# Patient Record
Sex: FEMALE | Race: OTHER | HISPANIC OR LATINO | Employment: PART TIME | URBAN - METROPOLITAN AREA
[De-identification: names, ages, dates, MRNs, and addresses within clinical notes are randomized per-mention and may not be internally consistent; named-entity substitution may affect disease eponyms.]

---

## 2022-07-01 ENCOUNTER — HOSPITAL ENCOUNTER (EMERGENCY)
Facility: MEDICAL CENTER | Age: 22
End: 2022-07-01
Attending: EMERGENCY MEDICINE
Payer: MEDICAID

## 2022-07-01 ENCOUNTER — APPOINTMENT (OUTPATIENT)
Dept: RADIOLOGY | Facility: MEDICAL CENTER | Age: 22
End: 2022-07-01
Attending: EMERGENCY MEDICINE
Payer: MEDICAID

## 2022-07-01 VITALS
BODY MASS INDEX: 25.05 KG/M2 | RESPIRATION RATE: 16 BRPM | SYSTOLIC BLOOD PRESSURE: 106 MMHG | HEART RATE: 72 BPM | DIASTOLIC BLOOD PRESSURE: 72 MMHG | HEIGHT: 65 IN | WEIGHT: 150.35 LBS | TEMPERATURE: 98.2 F | OXYGEN SATURATION: 96 %

## 2022-07-01 DIAGNOSIS — O00.102 LEFT TUBAL PREGNANCY WITHOUT INTRAUTERINE PREGNANCY: ICD-10-CM

## 2022-07-01 DIAGNOSIS — Z33.2 TERMINATION OF PREGNANCY (FETUS): ICD-10-CM

## 2022-07-01 LAB
ALBUMIN SERPL BCP-MCNC: 4.4 G/DL (ref 3.2–4.9)
ALBUMIN/GLOB SERPL: 1.4 G/DL
ALP SERPL-CCNC: 48 U/L (ref 30–99)
ALT SERPL-CCNC: 35 U/L (ref 2–50)
ANION GAP SERPL CALC-SCNC: 12 MMOL/L (ref 7–16)
AST SERPL-CCNC: 22 U/L (ref 12–45)
B-HCG SERPL-ACNC: 3931 MIU/ML (ref 0–5)
BASOPHILS # BLD AUTO: 0.3 % (ref 0–1.8)
BASOPHILS # BLD: 0.03 K/UL (ref 0–0.12)
BILIRUB SERPL-MCNC: 0.2 MG/DL (ref 0.1–1.5)
BUN SERPL-MCNC: 8 MG/DL (ref 8–22)
CALCIUM SERPL-MCNC: 10 MG/DL (ref 8.5–10.5)
CHLORIDE SERPL-SCNC: 101 MMOL/L (ref 96–112)
CO2 SERPL-SCNC: 22 MMOL/L (ref 20–33)
CREAT SERPL-MCNC: 0.57 MG/DL (ref 0.5–1.4)
EOSINOPHIL # BLD AUTO: 0.15 K/UL (ref 0–0.51)
EOSINOPHIL NFR BLD: 1.7 % (ref 0–6.9)
ERYTHROCYTE [DISTWIDTH] IN BLOOD BY AUTOMATED COUNT: 44.3 FL (ref 35.9–50)
GFR SERPLBLD CREATININE-BSD FMLA CKD-EPI: 133 ML/MIN/1.73 M 2
GLOBULIN SER CALC-MCNC: 3.1 G/DL (ref 1.9–3.5)
GLUCOSE SERPL-MCNC: 87 MG/DL (ref 65–99)
HCT VFR BLD AUTO: 38 % (ref 37–47)
HGB BLD-MCNC: 12.9 G/DL (ref 12–16)
IMM GRANULOCYTES # BLD AUTO: 0.02 K/UL (ref 0–0.11)
IMM GRANULOCYTES NFR BLD AUTO: 0.2 % (ref 0–0.9)
LYMPHOCYTES # BLD AUTO: 1.73 K/UL (ref 1–4.8)
LYMPHOCYTES NFR BLD: 19.4 % (ref 22–41)
MCH RBC QN AUTO: 29.6 PG (ref 27–33)
MCHC RBC AUTO-ENTMCNC: 33.9 G/DL (ref 33.6–35)
MCV RBC AUTO: 87.2 FL (ref 81.4–97.8)
MONOCYTES # BLD AUTO: 0.71 K/UL (ref 0–0.85)
MONOCYTES NFR BLD AUTO: 8 % (ref 0–13.4)
NEUTROPHILS # BLD AUTO: 6.27 K/UL (ref 2–7.15)
NEUTROPHILS NFR BLD: 70.4 % (ref 44–72)
NRBC # BLD AUTO: 0 K/UL
NRBC BLD-RTO: 0 /100 WBC
PLATELET # BLD AUTO: 290 K/UL (ref 164–446)
PMV BLD AUTO: 9.4 FL (ref 9–12.9)
POTASSIUM SERPL-SCNC: 3.9 MMOL/L (ref 3.6–5.5)
PROT SERPL-MCNC: 7.5 G/DL (ref 6–8.2)
RBC # BLD AUTO: 4.36 M/UL (ref 4.2–5.4)
SODIUM SERPL-SCNC: 135 MMOL/L (ref 135–145)
WBC # BLD AUTO: 8.9 K/UL (ref 4.8–10.8)

## 2022-07-01 PROCEDURE — A9270 NON-COVERED ITEM OR SERVICE: HCPCS | Performed by: EMERGENCY MEDICINE

## 2022-07-01 PROCEDURE — 84702 CHORIONIC GONADOTROPIN TEST: CPT

## 2022-07-01 PROCEDURE — 700102 HCHG RX REV CODE 250 W/ 637 OVERRIDE(OP): Performed by: EMERGENCY MEDICINE

## 2022-07-01 PROCEDURE — 36415 COLL VENOUS BLD VENIPUNCTURE: CPT

## 2022-07-01 PROCEDURE — 85025 COMPLETE CBC W/AUTO DIFF WBC: CPT

## 2022-07-01 PROCEDURE — 76856 US EXAM PELVIC COMPLETE: CPT

## 2022-07-01 PROCEDURE — 80053 COMPREHEN METABOLIC PANEL: CPT

## 2022-07-01 PROCEDURE — 99284 EMERGENCY DEPT VISIT MOD MDM: CPT

## 2022-07-01 RX ADMIN — IBUPROFEN 800 MG: 200 TABLET, FILM COATED ORAL at 20:26

## 2022-07-02 NOTE — ED NOTES
Pt provided discharge paperwork and educated on their contents. Pt discharged from ED w/ steady gait, breathing normally, GCS 15.

## 2022-07-02 NOTE — ED TRIAGE NOTES
"Chief Complaint   Patient presents with   • Abdominal Pain     Pt had  on . Pt went to [facility] on Wednesday for check up and was told she possibly has endometriosis, PID, gonorrhea and chlamydia. Pt denies any abnormal symptoms prior to this appointment. Pt was tested and given prophylactic abx on Wednesday and has been having 8/10 lower abdominal pain since.      Pt ambulatory. Pt educated to inform staff of any changes.     /78   Pulse (!) 105   Temp 37.2 °C (98.9 °F) (Temporal)   Resp 18   Ht 1.651 m (5' 5\")   Wt 68.2 kg (150 lb 5.7 oz)   SpO2 98%   BMI 25.02 kg/m²     "

## 2022-07-02 NOTE — ED PROVIDER NOTES
ED Provider Note    CHIEF COMPLAINT  Chief Complaint   Patient presents with   • Abdominal Pain     Pt had  on . Pt went to [facility] on Wednesday for check up and was told she possibly has endometriosis, PID, gonorrhea and chlamydia. Pt denies any abnormal symptoms prior to this appointment. Pt was tested and given prophylactic abx on Wednesday and has been having 8/10 lower abdominal pain since.        HPI  Tobias Coffey is a 21 y.o. female who presents to the emergency department with chief complaint of lower abdominal cramping and vaginal bleeding.  The patient had a planned  through [facility] on Monday where she took oral medications she states she had some bleeding but still had some cramping so she went back Wednesday and they treated her again with methotrexate.  She states at that time they said she might have endometritis and also treated her with Rocephin and doxycycline but she states she is sexually active with one partner and has not had any abnormal vaginal discharge itching burning or fevers.  She presents here today because she continues to have cramping in her lower abdomen she states she is passing clots and having some bleeding but its not heavy she is not going through multiple pads an hour.  She is a G1, P0 her LMP was May 24    REVIEW OF SYSTEMS  Positives as above. Pertinent negatives include nausea vomiting fevers chills abnormal vaginal discharge itching burning dysuria hematuria flank pain  All other review of systems are negative    PAST MEDICAL HISTORY       SOCIAL HISTORY  Social History     Tobacco Use   • Smoking status: Never Smoker   • Smokeless tobacco: Never Used   Vaping Use   • Vaping Use: Never used   Substance and Sexual Activity   • Alcohol use: Not Currently   • Drug use: Not Currently   • Sexual activity: Not on file       SURGICAL HISTORY  patient denies any surgical history    CURRENT MEDICATIONS  Home Medications      "Reviewed by Cheryl Marie R.N. (Registered Nurse) on 22 at 1900  Med List Status: Not Addressed   Medication Last Dose Status        Patient Kirill Taking any Medications                       ALLERGIES  No Known Allergies    PHYSICAL EXAM  VITAL SIGNS: /69   Pulse 88   Temp 37.2 °C (98.9 °F) (Temporal)   Resp 18   Ht 1.651 m (5' 5\")   Wt 68.2 kg (150 lb 5.7 oz)   SpO2 98%   BMI 25.02 kg/m²    Pulse ox interpretation: I interpret this pulse ox as normal.  Constitutional: Alert in no apparent distress.  HENT: Normocephalic atraumatic, MMM  Eyes: PER, Conjunctiva normal, Non-icteric.   Neck: Normal range of motion, No tenderness, Supple, No stridor.   Cardiovascular: Regular rate and rhythm, no murmurs.   Thorax & Lungs: Normal breath sounds, No respiratory distress, No wheezing, No chest tenderness.   Abdomen: Bowel sounds normal, Soft, No tenderness, No pulsatile masses. No peritoneal signs.  Skin: Warm, Dry, No erythema, No rash.   Back: No bony tenderness, No CVA tenderness.   Extremities/MSK: Intact equal distal pulses, No edema, No tenderness, No cyanosis, no major deformities noted  Neurologic: Alert and oriented x3, No focal deficits noted.       DIFFERENTIAL DIAGNOSIS AND WORK UP PLAN    This is a 21 y.o. female who presents with planned  that she is taking methotrexate x2 she states she is having a lot of cramping and bleeding we discussed the normal findings of a planned and medically induced  in terms of the cramping and bleeding but we will perform an ultrasound at this time to evaluate for retained products which she is not having heavy bleeding at this time she otherwise well-appearing her abdomen is soft nontender she has not taken anything for pain since this morning it was Tylenol she has not taken ibuprofen in a few days.    DIAGNOSTIC STUDIES / PROCEDURES    EKG  No results found for this or any previous visit.    LABS  Pertinent Lab Findings    Labs Reviewed " "  HCG QUANTITATIVE - Abnormal; Notable for the following components:       Result Value    Bhcg 3931.0 (*)     All other components within normal limits   CBC WITH DIFFERENTIAL - Abnormal; Notable for the following components:    Lymphocytes 19.40 (*)     All other components within normal limits   COMP METABOLIC PANEL   ESTIMATED GFR       RADIOLOGY  US-PELVIC COMPLETE (TRANSABDOMINAL/TRANSVAGINAL) (COMBO)   Final Result         1.  Thickened heterogeneous endometrial stripe, may represent proliferative changes given patient age. Given recent history of , could represent retained products of conception.   2.  Nabothian cysts        The radiologist's interpretation of all radiological studies have been reviewed by me.      COURSE & MEDICAL DECISION MAKING  Pertinent Labs & Imaging studies reviewed. (See chart for details)    9:47 PM  I treated the patient with ibuprofen and she did have some improvement in her symptoms fortunately there is no evidence of elevated white blood cell count and she has a normal hemoglobin, her hCG is elevated at 3900.  I did discuss the case with Dr. Epps on-call for OB/GYN who at this time based on the fact that she is not having heavy bleeding recommends repeat hCG and follow-up in his clinic next week.  Strict return precautions for any severe bleeding fevers nausea vomiting.  I discussed this with the patient and her partner at the bedside they understand and feel comfortable with the plan.    /72   Pulse 72   Temp 36.8 °C (98.2 °F) (Temporal)   Resp 16   Ht 1.651 m (5' 5\")   Wt 68.2 kg (150 lb 5.7 oz)   SpO2 96%   BMI 25.02 kg/m²       I verified that the patient was wearing a mask and I was wearing appropriate PPE every time I entered the room. The patient's mask was on the patient at all times during my encounter except for a brief view of the oropharynx.      The patient will return for new or worsening symptoms and is stable at the time of discharge.    The " patient is referred to a primary physician for blood pressure management, diabetic screening, and for all other preventative health concerns.    DISPOSITION:  Patient will be discharged home in stable condition.    FOLLOW UP:  Giovanny Epps M.D.  [address]  [facility] [id number] [phone]    Follow up on 7/8/2022  at 12:30 for f/u of hCG levels an possible repeat methotrexate.    [facility], Emergency Dept  [address] [address] [phone]    If symptoms worsen      OUTPATIENT MEDICATIONS:  There are no discharge medications for this patient.          FINAL IMPRESSION        CANCELED: HCG QUANTITATIVE    Termination of pregnancy (fetus)             Electronically signed by: Dulce Elena M.D., 7/1/2022 7:59 PM    This dictation has been created using voice recognition software and/or scribes. The accuracy of the dictation is limited by the abilities of the software and the expertise of the scribes. I expect there may be some errors of grammar and possibly content. I made every attempt to manually correct the errors within my dictation. However, errors related to voice recognition software and/or scribes may still exist and should be interpreted within the appropriate context.

## 2022-07-02 NOTE — ED NOTES
Pt ambulated to assigned room with a steady and stable gait. Pt now laying quietly in gurney. Pt still c/o abdominal pain but no apparent distress noted at this time. Breathing is non-labored with stable VS. Pt given warm blanket for comfort. Family at bedside.

## 2022-07-08 ENCOUNTER — HOSPITAL ENCOUNTER (OUTPATIENT)
Dept: LAB | Facility: MEDICAL CENTER | Age: 22
End: 2022-07-08
Attending: OBSTETRICS & GYNECOLOGY
Payer: MEDICAID

## 2022-07-08 LAB — B-HCG SERPL-ACNC: 2189 MIU/ML (ref 0–5)

## 2022-07-08 PROCEDURE — 36415 COLL VENOUS BLD VENIPUNCTURE: CPT

## 2022-07-08 PROCEDURE — 84702 CHORIONIC GONADOTROPIN TEST: CPT

## 2025-05-15 ENCOUNTER — HOSPITAL ENCOUNTER (EMERGENCY)
Facility: MEDICAL CENTER | Age: 25
End: 2025-05-16
Attending: EMERGENCY MEDICINE

## 2025-05-15 ENCOUNTER — APPOINTMENT (OUTPATIENT)
Dept: RADIOLOGY | Facility: MEDICAL CENTER | Age: 25
End: 2025-05-15
Attending: EMERGENCY MEDICINE

## 2025-05-15 ENCOUNTER — ANESTHESIA (OUTPATIENT)
Dept: SURGERY | Facility: MEDICAL CENTER | Age: 25
End: 2025-05-15

## 2025-05-15 ENCOUNTER — ANESTHESIA EVENT (OUTPATIENT)
Dept: SURGERY | Facility: MEDICAL CENTER | Age: 25
End: 2025-05-15

## 2025-05-15 DIAGNOSIS — O00.202 LEFT OVARIAN PREGNANCY WITHOUT INTRAUTERINE PREGNANCY: ICD-10-CM

## 2025-05-15 DIAGNOSIS — R10.0 SURGICAL ABDOMEN: Primary | ICD-10-CM

## 2025-05-15 LAB
ABO GROUP BLD: NORMAL
ALBUMIN SERPL BCP-MCNC: 4.4 G/DL (ref 3.2–4.9)
ALBUMIN/GLOB SERPL: 1.3 G/DL
ALP SERPL-CCNC: 73 U/L (ref 30–99)
ALT SERPL-CCNC: 25 U/L (ref 2–50)
ANION GAP SERPL CALC-SCNC: 12 MMOL/L (ref 7–16)
APPEARANCE UR: CLEAR
AST SERPL-CCNC: 20 U/L (ref 12–45)
B-HCG SERPL-ACNC: 481 MIU/ML (ref 0–5)
BACTERIA #/AREA URNS HPF: ABNORMAL /HPF
BASOPHILS # BLD AUTO: 0.4 % (ref 0–1.8)
BASOPHILS # BLD: 0.04 K/UL (ref 0–0.12)
BILIRUB SERPL-MCNC: 0.3 MG/DL (ref 0.1–1.5)
BILIRUB UR QL STRIP.AUTO: NEGATIVE
BLD GP AB SCN SERPL QL: NORMAL
BUN SERPL-MCNC: 8 MG/DL (ref 8–22)
CALCIUM ALBUM COR SERPL-MCNC: 8.8 MG/DL (ref 8.5–10.5)
CALCIUM SERPL-MCNC: 9.1 MG/DL (ref 8.5–10.5)
CASTS URNS QL MICRO: ABNORMAL /LPF (ref 0–2)
CHLORIDE SERPL-SCNC: 103 MMOL/L (ref 96–112)
CO2 SERPL-SCNC: 20 MMOL/L (ref 20–33)
COLOR UR: YELLOW
CREAT SERPL-MCNC: 0.75 MG/DL (ref 0.5–1.4)
EOSINOPHIL # BLD AUTO: 0.01 K/UL (ref 0–0.51)
EOSINOPHIL NFR BLD: 0.1 % (ref 0–6.9)
EPITHELIAL CELLS 1715: ABNORMAL /HPF (ref 0–5)
ERYTHROCYTE [DISTWIDTH] IN BLOOD BY AUTOMATED COUNT: 43.2 FL (ref 35.9–50)
GFR SERPLBLD CREATININE-BSD FMLA CKD-EPI: 115 ML/MIN/1.73 M 2
GLOBULIN SER CALC-MCNC: 3.4 G/DL (ref 1.9–3.5)
GLUCOSE SERPL-MCNC: 103 MG/DL (ref 65–99)
GLUCOSE UR STRIP.AUTO-MCNC: NEGATIVE MG/DL
HCT VFR BLD AUTO: 42.9 % (ref 37–47)
HGB BLD-MCNC: 14.7 G/DL (ref 12–16)
HYALINE CAST   1831: PRESENT /LPF
IMM GRANULOCYTES # BLD AUTO: 0.05 K/UL (ref 0–0.11)
IMM GRANULOCYTES NFR BLD AUTO: 0.5 % (ref 0–0.9)
KETONES UR STRIP.AUTO-MCNC: 80 MG/DL
LEUKOCYTE ESTERASE UR QL STRIP.AUTO: NEGATIVE
LYMPHOCYTES # BLD AUTO: 1.26 K/UL (ref 1–4.8)
LYMPHOCYTES NFR BLD: 11.6 % (ref 22–41)
MCH RBC QN AUTO: 29.6 PG (ref 27–33)
MCHC RBC AUTO-ENTMCNC: 34.3 G/DL (ref 32.2–35.5)
MCV RBC AUTO: 86.5 FL (ref 81.4–97.8)
MICRO URNS: ABNORMAL
MONOCYTES # BLD AUTO: 0.46 K/UL (ref 0–0.85)
MONOCYTES NFR BLD AUTO: 4.2 % (ref 0–13.4)
MUCOUS THREADS URNS QL MICRO: PRESENT /HPF
NEUTROPHILS # BLD AUTO: 9.02 K/UL (ref 1.82–7.42)
NEUTROPHILS NFR BLD: 83.2 % (ref 44–72)
NITRITE UR QL STRIP.AUTO: NEGATIVE
NRBC # BLD AUTO: 0 K/UL
NRBC BLD-RTO: 0 /100 WBC (ref 0–0.2)
NUMBER OF RH DOSES IND 8505RD: NORMAL
PH UR STRIP.AUTO: 6 [PH] (ref 5–8)
PLATELET # BLD AUTO: 357 K/UL (ref 164–446)
PMV BLD AUTO: 9.1 FL (ref 9–12.9)
POTASSIUM SERPL-SCNC: 3.7 MMOL/L (ref 3.6–5.5)
PROT SERPL-MCNC: 7.8 G/DL (ref 6–8.2)
PROT UR QL STRIP: NEGATIVE MG/DL
RBC # BLD AUTO: 4.96 M/UL (ref 4.2–5.4)
RBC # URNS HPF: ABNORMAL /HPF (ref 0–2)
RBC UR QL AUTO: ABNORMAL
RH BLD: NORMAL
RH BLD: NORMAL
SODIUM SERPL-SCNC: 135 MMOL/L (ref 135–145)
SP GR UR STRIP.AUTO: 1.02
UROBILINOGEN UR STRIP.AUTO-MCNC: 0.2 EU/DL
WBC # BLD AUTO: 10.8 K/UL (ref 4.8–10.8)
WBC #/AREA URNS HPF: ABNORMAL /HPF

## 2025-05-15 PROCEDURE — 700111 HCHG RX REV CODE 636 W/ 250 OVERRIDE (IP): Mod: JZ | Performed by: STUDENT IN AN ORGANIZED HEALTH CARE EDUCATION/TRAINING PROGRAM

## 2025-05-15 PROCEDURE — 160015 HCHG STAT PREOP MINUTES: Performed by: STUDENT IN AN ORGANIZED HEALTH CARE EDUCATION/TRAINING PROGRAM

## 2025-05-15 PROCEDURE — 36415 COLL VENOUS BLD VENIPUNCTURE: CPT

## 2025-05-15 PROCEDURE — 86850 RBC ANTIBODY SCREEN: CPT

## 2025-05-15 PROCEDURE — 88305 TISSUE EXAM BY PATHOLOGIST: CPT | Performed by: PATHOLOGY

## 2025-05-15 PROCEDURE — 76817 TRANSVAGINAL US OBSTETRIC: CPT

## 2025-05-15 PROCEDURE — 700102 HCHG RX REV CODE 250 W/ 637 OVERRIDE(OP): Performed by: STUDENT IN AN ORGANIZED HEALTH CARE EDUCATION/TRAINING PROGRAM

## 2025-05-15 PROCEDURE — 88305 TISSUE EXAM BY PATHOLOGIST: CPT | Mod: 26 | Performed by: PATHOLOGY

## 2025-05-15 PROCEDURE — 160048 HCHG OR STATISTICAL LEVEL 1-5: Performed by: STUDENT IN AN ORGANIZED HEALTH CARE EDUCATION/TRAINING PROGRAM

## 2025-05-15 PROCEDURE — 84702 CHORIONIC GONADOTROPIN TEST: CPT

## 2025-05-15 PROCEDURE — 99282 EMERGENCY DEPT VISIT SF MDM: CPT | Mod: 57 | Performed by: STUDENT IN AN ORGANIZED HEALTH CARE EDUCATION/TRAINING PROGRAM

## 2025-05-15 PROCEDURE — 86901 BLOOD TYPING SEROLOGIC RH(D): CPT

## 2025-05-15 PROCEDURE — 160009 HCHG ANES TIME/MIN: Performed by: STUDENT IN AN ORGANIZED HEALTH CARE EDUCATION/TRAINING PROGRAM

## 2025-05-15 PROCEDURE — 81001 URINALYSIS AUTO W/SCOPE: CPT

## 2025-05-15 PROCEDURE — 59151 TREAT ECTOPIC PREGNANCY: CPT | Performed by: STUDENT IN AN ORGANIZED HEALTH CARE EDUCATION/TRAINING PROGRAM

## 2025-05-15 PROCEDURE — 99291 CRITICAL CARE FIRST HOUR: CPT

## 2025-05-15 PROCEDURE — RXMED WILLOW AMBULATORY MEDICATION CHARGE: Performed by: STUDENT IN AN ORGANIZED HEALTH CARE EDUCATION/TRAINING PROGRAM

## 2025-05-15 PROCEDURE — 160035 HCHG PACU - 1ST 60 MINS PHASE I: Performed by: STUDENT IN AN ORGANIZED HEALTH CARE EDUCATION/TRAINING PROGRAM

## 2025-05-15 PROCEDURE — 86900 BLOOD TYPING SEROLOGIC ABO: CPT

## 2025-05-15 PROCEDURE — 80053 COMPREHEN METABOLIC PANEL: CPT

## 2025-05-15 PROCEDURE — 85025 COMPLETE CBC W/AUTO DIFF WBC: CPT

## 2025-05-15 PROCEDURE — 700101 HCHG RX REV CODE 250: Performed by: STUDENT IN AN ORGANIZED HEALTH CARE EDUCATION/TRAINING PROGRAM

## 2025-05-15 PROCEDURE — 160029 HCHG SURGERY MINUTES - 1ST 30 MINS LEVEL 4: Performed by: STUDENT IN AN ORGANIZED HEALTH CARE EDUCATION/TRAINING PROGRAM

## 2025-05-15 PROCEDURE — 160025 RECOVERY II MINUTES (STATS): Performed by: STUDENT IN AN ORGANIZED HEALTH CARE EDUCATION/TRAINING PROGRAM

## 2025-05-15 PROCEDURE — 700105 HCHG RX REV CODE 258: Performed by: STUDENT IN AN ORGANIZED HEALTH CARE EDUCATION/TRAINING PROGRAM

## 2025-05-15 PROCEDURE — A9270 NON-COVERED ITEM OR SERVICE: HCPCS | Performed by: STUDENT IN AN ORGANIZED HEALTH CARE EDUCATION/TRAINING PROGRAM

## 2025-05-15 PROCEDURE — 160046 HCHG PACU - 1ST 60 MINS PHASE II: Performed by: STUDENT IN AN ORGANIZED HEALTH CARE EDUCATION/TRAINING PROGRAM

## 2025-05-15 PROCEDURE — 700101 HCHG RX REV CODE 250: Performed by: EMERGENCY MEDICINE

## 2025-05-15 PROCEDURE — 700111 HCHG RX REV CODE 636 W/ 250 OVERRIDE (IP): Mod: UD | Performed by: EMERGENCY MEDICINE

## 2025-05-15 PROCEDURE — 160002 HCHG RECOVERY MINUTES (STAT): Performed by: STUDENT IN AN ORGANIZED HEALTH CARE EDUCATION/TRAINING PROGRAM

## 2025-05-15 PROCEDURE — 160041 HCHG SURGERY MINUTES - EA ADDL 1 MIN LEVEL 4: Performed by: STUDENT IN AN ORGANIZED HEALTH CARE EDUCATION/TRAINING PROGRAM

## 2025-05-15 RX ORDER — BUPIVACAINE HYDROCHLORIDE AND EPINEPHRINE 2.5; 5 MG/ML; UG/ML
INJECTION, SOLUTION EPIDURAL; INFILTRATION; INTRACAUDAL; PERINEURAL
Status: DISCONTINUED | OUTPATIENT
Start: 2025-05-15 | End: 2025-05-15 | Stop reason: HOSPADM

## 2025-05-15 RX ORDER — LIDOCAINE AND PRILOCAINE 25; 25 MG/G; MG/G
CREAM TOPICAL ONCE
Status: COMPLETED | OUTPATIENT
Start: 2025-05-15 | End: 2025-05-15

## 2025-05-15 RX ORDER — EPHEDRINE SULFATE 50 MG/ML
5 INJECTION, SOLUTION INTRAVENOUS
Status: DISCONTINUED | OUTPATIENT
Start: 2025-05-15 | End: 2025-05-16 | Stop reason: HOSPADM

## 2025-05-15 RX ORDER — OXYCODONE AND ACETAMINOPHEN 5; 325 MG/1; MG/1
1 TABLET ORAL EVERY 4 HOURS PRN
Qty: 15 TABLET | Refills: 0 | Status: SHIPPED | OUTPATIENT
Start: 2025-05-15 | End: 2025-05-19

## 2025-05-15 RX ORDER — OXYCODONE HCL 5 MG/5 ML
5 SOLUTION, ORAL ORAL
Refills: 0 | Status: DISCONTINUED | OUTPATIENT
Start: 2025-05-15 | End: 2025-05-15 | Stop reason: HOSPADM

## 2025-05-15 RX ORDER — OXYCODONE HCL 5 MG/5 ML
10 SOLUTION, ORAL ORAL
Refills: 0 | Status: DISCONTINUED | OUTPATIENT
Start: 2025-05-15 | End: 2025-05-15 | Stop reason: HOSPADM

## 2025-05-15 RX ORDER — ONDANSETRON 4 MG/1
4 TABLET, ORALLY DISINTEGRATING ORAL EVERY 4 HOURS PRN
Status: CANCELLED | OUTPATIENT
Start: 2025-05-15

## 2025-05-15 RX ORDER — LABETALOL HYDROCHLORIDE 5 MG/ML
5 INJECTION, SOLUTION INTRAVENOUS
Status: DISCONTINUED | OUTPATIENT
Start: 2025-05-15 | End: 2025-05-16 | Stop reason: HOSPADM

## 2025-05-15 RX ORDER — HYDRALAZINE HYDROCHLORIDE 20 MG/ML
5 INJECTION INTRAMUSCULAR; INTRAVENOUS
Status: DISCONTINUED | OUTPATIENT
Start: 2025-05-15 | End: 2025-05-16 | Stop reason: HOSPADM

## 2025-05-15 RX ORDER — ONDANSETRON 2 MG/ML
4 INJECTION INTRAMUSCULAR; INTRAVENOUS
Status: DISCONTINUED | OUTPATIENT
Start: 2025-05-15 | End: 2025-05-16 | Stop reason: HOSPADM

## 2025-05-15 RX ORDER — HYDROMORPHONE HYDROCHLORIDE 1 MG/ML
0.4 INJECTION, SOLUTION INTRAMUSCULAR; INTRAVENOUS; SUBCUTANEOUS
Status: DISCONTINUED | OUTPATIENT
Start: 2025-05-15 | End: 2025-05-16 | Stop reason: HOSPADM

## 2025-05-15 RX ORDER — OXYCODONE HCL 5 MG/5 ML
10 SOLUTION, ORAL ORAL
Refills: 0 | Status: COMPLETED | OUTPATIENT
Start: 2025-05-15 | End: 2025-05-15

## 2025-05-15 RX ORDER — DIPHENHYDRAMINE HYDROCHLORIDE 50 MG/ML
12.5 INJECTION, SOLUTION INTRAMUSCULAR; INTRAVENOUS
Status: DISCONTINUED | OUTPATIENT
Start: 2025-05-15 | End: 2025-05-16 | Stop reason: HOSPADM

## 2025-05-15 RX ORDER — IPRATROPIUM BROMIDE AND ALBUTEROL SULFATE 2.5; .5 MG/3ML; MG/3ML
3 SOLUTION RESPIRATORY (INHALATION)
Status: DISCONTINUED | OUTPATIENT
Start: 2025-05-15 | End: 2025-05-15 | Stop reason: HOSPADM

## 2025-05-15 RX ORDER — CEFAZOLIN SODIUM 1 G/3ML
INJECTION, POWDER, FOR SOLUTION INTRAMUSCULAR; INTRAVENOUS PRN
Status: DISCONTINUED | OUTPATIENT
Start: 2025-05-15 | End: 2025-05-15 | Stop reason: SURG

## 2025-05-15 RX ORDER — OXYCODONE HCL 5 MG/5 ML
5 SOLUTION, ORAL ORAL
Refills: 0 | Status: COMPLETED | OUTPATIENT
Start: 2025-05-15 | End: 2025-05-15

## 2025-05-15 RX ORDER — HYDROMORPHONE HYDROCHLORIDE 1 MG/ML
0.2 INJECTION, SOLUTION INTRAMUSCULAR; INTRAVENOUS; SUBCUTANEOUS
Status: DISCONTINUED | OUTPATIENT
Start: 2025-05-15 | End: 2025-05-15 | Stop reason: HOSPADM

## 2025-05-15 RX ORDER — SODIUM CHLORIDE, SODIUM LACTATE, POTASSIUM CHLORIDE, CALCIUM CHLORIDE 600; 310; 30; 20 MG/100ML; MG/100ML; MG/100ML; MG/100ML
INJECTION, SOLUTION INTRAVENOUS
Status: DISCONTINUED | OUTPATIENT
Start: 2025-05-15 | End: 2025-05-15 | Stop reason: SURG

## 2025-05-15 RX ORDER — EPHEDRINE SULFATE 50 MG/ML
5 INJECTION, SOLUTION INTRAVENOUS
Status: DISCONTINUED | OUTPATIENT
Start: 2025-05-15 | End: 2025-05-15 | Stop reason: HOSPADM

## 2025-05-15 RX ORDER — MEPERIDINE HYDROCHLORIDE 25 MG/ML
12.5 INJECTION INTRAMUSCULAR; INTRAVENOUS; SUBCUTANEOUS
Status: DISCONTINUED | OUTPATIENT
Start: 2025-05-15 | End: 2025-05-16 | Stop reason: HOSPADM

## 2025-05-15 RX ORDER — HYDROMORPHONE HYDROCHLORIDE 1 MG/ML
0.1 INJECTION, SOLUTION INTRAMUSCULAR; INTRAVENOUS; SUBCUTANEOUS
Status: DISCONTINUED | OUTPATIENT
Start: 2025-05-15 | End: 2025-05-15 | Stop reason: HOSPADM

## 2025-05-15 RX ORDER — LABETALOL HYDROCHLORIDE 5 MG/ML
5 INJECTION, SOLUTION INTRAVENOUS
Status: DISCONTINUED | OUTPATIENT
Start: 2025-05-15 | End: 2025-05-15 | Stop reason: HOSPADM

## 2025-05-15 RX ORDER — ONDANSETRON 4 MG/1
4 TABLET, FILM COATED ORAL EVERY 4 HOURS PRN
Qty: 20 TABLET | Refills: 0 | Status: SHIPPED | OUTPATIENT
Start: 2025-05-15 | End: 2025-05-19

## 2025-05-15 RX ORDER — IBUPROFEN 400 MG/1
TABLET, FILM COATED ORAL
Qty: 60 TABLET | Refills: 1 | Status: SHIPPED | OUTPATIENT
Start: 2025-05-15

## 2025-05-15 RX ORDER — DEXAMETHASONE SODIUM PHOSPHATE 4 MG/ML
INJECTION, SOLUTION INTRA-ARTICULAR; INTRALESIONAL; INTRAMUSCULAR; INTRAVENOUS; SOFT TISSUE PRN
Status: DISCONTINUED | OUTPATIENT
Start: 2025-05-15 | End: 2025-05-15 | Stop reason: SURG

## 2025-05-15 RX ORDER — IPRATROPIUM BROMIDE AND ALBUTEROL SULFATE 2.5; .5 MG/3ML; MG/3ML
3 SOLUTION RESPIRATORY (INHALATION)
Status: DISCONTINUED | OUTPATIENT
Start: 2025-05-15 | End: 2025-05-16 | Stop reason: HOSPADM

## 2025-05-15 RX ORDER — HYDROMORPHONE HYDROCHLORIDE 1 MG/ML
0.1 INJECTION, SOLUTION INTRAMUSCULAR; INTRAVENOUS; SUBCUTANEOUS
Status: DISCONTINUED | OUTPATIENT
Start: 2025-05-15 | End: 2025-05-16 | Stop reason: HOSPADM

## 2025-05-15 RX ORDER — SODIUM CHLORIDE, SODIUM LACTATE, POTASSIUM CHLORIDE, CALCIUM CHLORIDE 600; 310; 30; 20 MG/100ML; MG/100ML; MG/100ML; MG/100ML
INJECTION, SOLUTION INTRAVENOUS CONTINUOUS
Status: DISCONTINUED | OUTPATIENT
Start: 2025-05-15 | End: 2025-05-16 | Stop reason: HOSPADM

## 2025-05-15 RX ORDER — ROCURONIUM BROMIDE 10 MG/ML
INJECTION, SOLUTION INTRAVENOUS PRN
Status: DISCONTINUED | OUTPATIENT
Start: 2025-05-15 | End: 2025-05-15 | Stop reason: SURG

## 2025-05-15 RX ORDER — IBUPROFEN 200 MG
800 TABLET ORAL EVERY 6 HOURS PRN
Status: CANCELLED | OUTPATIENT
Start: 2025-05-15

## 2025-05-15 RX ORDER — HALOPERIDOL 5 MG/ML
1 INJECTION INTRAMUSCULAR
Status: DISCONTINUED | OUTPATIENT
Start: 2025-05-15 | End: 2025-05-15 | Stop reason: HOSPADM

## 2025-05-15 RX ORDER — OXYCODONE AND ACETAMINOPHEN 5; 325 MG/1; MG/1
1 TABLET ORAL EVERY 4 HOURS PRN
Refills: 0 | Status: CANCELLED | OUTPATIENT
Start: 2025-05-15

## 2025-05-15 RX ORDER — ONDANSETRON 2 MG/ML
4 INJECTION INTRAMUSCULAR; INTRAVENOUS
Status: DISCONTINUED | OUTPATIENT
Start: 2025-05-15 | End: 2025-05-15 | Stop reason: HOSPADM

## 2025-05-15 RX ORDER — LIDOCAINE HYDROCHLORIDE 20 MG/ML
INJECTION, SOLUTION EPIDURAL; INFILTRATION; INTRACAUDAL; PERINEURAL PRN
Status: DISCONTINUED | OUTPATIENT
Start: 2025-05-15 | End: 2025-05-15 | Stop reason: SURG

## 2025-05-15 RX ORDER — ONDANSETRON 2 MG/ML
INJECTION INTRAMUSCULAR; INTRAVENOUS PRN
Status: DISCONTINUED | OUTPATIENT
Start: 2025-05-15 | End: 2025-05-15 | Stop reason: SURG

## 2025-05-15 RX ORDER — HYDRALAZINE HYDROCHLORIDE 20 MG/ML
5 INJECTION INTRAMUSCULAR; INTRAVENOUS
Status: DISCONTINUED | OUTPATIENT
Start: 2025-05-15 | End: 2025-05-15 | Stop reason: HOSPADM

## 2025-05-15 RX ORDER — DIPHENHYDRAMINE HYDROCHLORIDE 50 MG/ML
12.5 INJECTION, SOLUTION INTRAMUSCULAR; INTRAVENOUS
Status: DISCONTINUED | OUTPATIENT
Start: 2025-05-15 | End: 2025-05-15 | Stop reason: HOSPADM

## 2025-05-15 RX ORDER — SODIUM CHLORIDE, SODIUM LACTATE, POTASSIUM CHLORIDE, CALCIUM CHLORIDE 600; 310; 30; 20 MG/100ML; MG/100ML; MG/100ML; MG/100ML
INJECTION, SOLUTION INTRAVENOUS CONTINUOUS
Status: DISCONTINUED | OUTPATIENT
Start: 2025-05-15 | End: 2025-05-15 | Stop reason: HOSPADM

## 2025-05-15 RX ORDER — MEPERIDINE HYDROCHLORIDE 50 MG/ML
12.5 INJECTION INTRAMUSCULAR; INTRAVENOUS; SUBCUTANEOUS
Status: DISCONTINUED | OUTPATIENT
Start: 2025-05-15 | End: 2025-05-15 | Stop reason: HOSPADM

## 2025-05-15 RX ORDER — HALOPERIDOL 5 MG/ML
1 INJECTION INTRAMUSCULAR
Status: DISCONTINUED | OUTPATIENT
Start: 2025-05-15 | End: 2025-05-16 | Stop reason: HOSPADM

## 2025-05-15 RX ORDER — ONDANSETRON 4 MG/1
4 TABLET, ORALLY DISINTEGRATING ORAL ONCE
Status: COMPLETED | OUTPATIENT
Start: 2025-05-15 | End: 2025-05-15

## 2025-05-15 RX ORDER — HYDROMORPHONE HYDROCHLORIDE 1 MG/ML
0.4 INJECTION, SOLUTION INTRAMUSCULAR; INTRAVENOUS; SUBCUTANEOUS
Status: DISCONTINUED | OUTPATIENT
Start: 2025-05-15 | End: 2025-05-15 | Stop reason: HOSPADM

## 2025-05-15 RX ORDER — HYDROMORPHONE HYDROCHLORIDE 1 MG/ML
0.2 INJECTION, SOLUTION INTRAMUSCULAR; INTRAVENOUS; SUBCUTANEOUS
Status: DISCONTINUED | OUTPATIENT
Start: 2025-05-15 | End: 2025-05-16 | Stop reason: HOSPADM

## 2025-05-15 RX ADMIN — LIDOCAINE AND PRILOCAINE 1 APPLICATION: 25; 25 CREAM TOPICAL at 18:22

## 2025-05-15 RX ADMIN — FENTANYL CITRATE 25 MCG: 50 INJECTION, SOLUTION INTRAMUSCULAR; INTRAVENOUS at 22:53

## 2025-05-15 RX ADMIN — FENTANYL CITRATE 50 MCG: 50 INJECTION, SOLUTION INTRAMUSCULAR; INTRAVENOUS at 22:18

## 2025-05-15 RX ADMIN — SUGAMMADEX 200 MG: 100 INJECTION, SOLUTION INTRAVENOUS at 23:01

## 2025-05-15 RX ADMIN — LIDOCAINE HYDROCHLORIDE 100 MG: 20 INJECTION, SOLUTION EPIDURAL; INFILTRATION; INTRACAUDAL; PERINEURAL at 22:18

## 2025-05-15 RX ADMIN — OXYCODONE HYDROCHLORIDE 10 MG: 5 SOLUTION ORAL at 23:23

## 2025-05-15 RX ADMIN — DEXAMETHASONE SODIUM PHOSPHATE 4 MG: 4 INJECTION INTRA-ARTICULAR; INTRALESIONAL; INTRAMUSCULAR; INTRAVENOUS; SOFT TISSUE at 22:18

## 2025-05-15 RX ADMIN — ONDANSETRON 4 MG: 2 INJECTION INTRAMUSCULAR; INTRAVENOUS at 23:01

## 2025-05-15 RX ADMIN — ROCURONIUM BROMIDE 50 MG: 10 INJECTION INTRAVENOUS at 22:18

## 2025-05-15 RX ADMIN — PROPOFOL 150 MG: 10 INJECTION, EMULSION INTRAVENOUS at 22:18

## 2025-05-15 RX ADMIN — FENTANYL CITRATE 50 MCG: 50 INJECTION, SOLUTION INTRAMUSCULAR; INTRAVENOUS at 23:35

## 2025-05-15 RX ADMIN — MEPERIDINE HYDROCHLORIDE 12.5 MG: 25 INJECTION INTRAMUSCULAR; INTRAVENOUS; SUBCUTANEOUS at 23:40

## 2025-05-15 RX ADMIN — FENTANYL CITRATE 25 MCG: 50 INJECTION, SOLUTION INTRAMUSCULAR; INTRAVENOUS at 23:12

## 2025-05-15 RX ADMIN — FENTANYL CITRATE 50 MCG: 50 INJECTION, SOLUTION INTRAMUSCULAR; INTRAVENOUS at 23:25

## 2025-05-15 RX ADMIN — SODIUM CHLORIDE, POTASSIUM CHLORIDE, SODIUM LACTATE AND CALCIUM CHLORIDE: 600; 310; 30; 20 INJECTION, SOLUTION INTRAVENOUS at 22:13

## 2025-05-15 RX ADMIN — FENTANYL CITRATE 50 MCG: 50 INJECTION, SOLUTION INTRAMUSCULAR; INTRAVENOUS at 23:02

## 2025-05-15 RX ADMIN — FENTANYL CITRATE 50 MCG: 50 INJECTION, SOLUTION INTRAMUSCULAR; INTRAVENOUS at 22:46

## 2025-05-15 RX ADMIN — MEPERIDINE HYDROCHLORIDE 12.5 MG: 25 INJECTION INTRAMUSCULAR; INTRAVENOUS; SUBCUTANEOUS at 23:30

## 2025-05-15 RX ADMIN — CEFAZOLIN 2 G: 1 INJECTION, POWDER, FOR SOLUTION INTRAMUSCULAR; INTRAVENOUS at 22:18

## 2025-05-15 RX ADMIN — ONDANSETRON 4 MG: 4 TABLET, ORALLY DISINTEGRATING ORAL at 18:26

## 2025-05-15 ASSESSMENT — PAIN DESCRIPTION - PAIN TYPE
TYPE: SURGICAL PAIN
TYPE: SURGICAL PAIN
TYPE: ACUTE PAIN
TYPE: SURGICAL PAIN

## 2025-05-15 ASSESSMENT — PAIN SCALES - GENERAL: PAIN_LEVEL: 3

## 2025-05-16 ENCOUNTER — PHARMACY VISIT (OUTPATIENT)
Dept: PHARMACY | Facility: MEDICAL CENTER | Age: 25
End: 2025-05-16
Payer: COMMERCIAL

## 2025-05-16 VITALS
RESPIRATION RATE: 17 BRPM | WEIGHT: 197.97 LBS | BODY MASS INDEX: 31.82 KG/M2 | SYSTOLIC BLOOD PRESSURE: 115 MMHG | OXYGEN SATURATION: 97 % | TEMPERATURE: 98 F | HEIGHT: 66 IN | HEART RATE: 89 BPM | DIASTOLIC BLOOD PRESSURE: 56 MMHG

## 2025-05-16 LAB — PATHOLOGY CONSULT NOTE: NORMAL

## 2025-05-16 ASSESSMENT — PAIN DESCRIPTION - PAIN TYPE
TYPE: SURGICAL PAIN

## 2025-05-16 NOTE — ED NOTES
ERP at bedside to discuss results of US with pt. Pt informed by ERP that she will need an IV. Pt now agreeable to PIV placement and blood draw.    Protopic Pregnancy And Lactation Text: This medication is Pregnancy Category C. It is unknown if this medication is excreted in breast milk when applied topically.

## 2025-05-16 NOTE — H&P
GYNECOLOGY EMERGENCY ROOM CONSULT AND PRE-OPERATIVE HISTORY AND PHYSICAL     CC: Vaginal Bleeding and abdominal pain for 3 days, positive home pregnancy test 1 week ago. [facility]          HPI: Patient is a 24 y.o.  who presented the ED today for a 3-day history of worsening lower abdominal pain. She found out she was pregnant 1 weeks ago when her period was late. Her LMP was 3/30/25. Does not desire this pregnancy and was planning on an  tomorrow.     On review of records, she had the same thing happen in 2022 and had an  and was ultimately given methotrexate.         GYNECOLOGIC HISTORY:  Current contraceptive: none  Last Pap: never had one  Denies any history of STIs or PID however it appears she was treated for this back in   In a stable, monogamous relationship with a male.   She denies any other previous pregnancies  Menstrual cycles, regular 28-30 days     OBSTETRIC HISTORY:  , possibly ectopic in G1 and now ectopic    MEDICAL HISTORY:  [Past Medical History] Denies. She does not have a routine primary care doctor       MEDICATIONS:  [Medications Ordered Prior to Encounter] state she does not take any    [Medications Ordered Prior to Encounter]  No current facility-administered medications on file prior to encounter.      No current outpatient medications on file prior to encounter.        ALLERGIES / REACTIONS:  [Allergies] denies       FAMILY HISTORY:  No family history of life-threatening reactions to anesthesia. No bleeding or blood clotting disorders.      SURGICAL HISTORY:  [Past Surgical History] WTE. Patient denies ever needing a blood transfusion. Never been under anesthesia       SOCIAL HISTORY:  She denies nicotine use, smokeless tobacco -vaping, she denies etoh use, uses inhaled marijuana rarely She denies other illicit substance use        ROS:  Denies lightheadedness, dizziness, nausea, ringing in her ears or hot flashes while  "currently lying in bed. She is having left sided lower abdominal cramping.      /57   Pulse 83   Temp 37.2 °C (98.9 °F) (Temporal)   Resp 18   Ht 5' 6\"   Wt 197 lb 15.6 oz   LMP 2025   SpO2 99%   BMI 31.95 kg/m²     Appearance/Psychiatric: She does not appear anxious although a little sad  Constitutional: The patient is well nourished.  Neck: Neck appears symmetric.  Heart: regular rate and rhythm  Lungs:clear to auscultation, Respirations unlabored, and no use of accessory muscles of inspiration noted  Abd: Soft, flat tender to light palpation. Patient is laying with her extended during the exam   Extremeties: Legs are symmetric and without tenderness.  Skin: No rash observed.     Latest Reference Range & Units 05/15/25 19:34   WBC 4.8 - 10.8 K/uL 10.8   Hematocrit 37.0 - 47.0 % 42.9   MCV 81.4 - 97.8 fL 86.5   Platelet Count 164 - 446 K/uL 357   Bhcg 0.0 - 5.0 mIU/mL 481.0 (H)   (H): Data is abnormally high       A/P:   Ruptured right tubal ectopic pregnancy causing hemoperitoneum           24y.o.   at 6+4weeks by LMP presenting to the ED for LLQ pain, elevated HCG and US findings concerning for ruptured ectopic pregnancy. Hemodynamically stable at this moment.      Even though she is stable currently, given that she has hemoperitoneum and a pelvic pass she was offered surgical evaluation and management of her ruptured ectopic pregnancy versus expectant or medical management, although these were reviewed with her in the context of options in general of management of ectopic pregnancies.      The risk of laparoscopic surgery was reviewed with her and includes the risk of unintended injury to surrounding, structures or organs, failure to identify the abnormal pregnancy, need for transfusion, risk of tubal removal and risks of laparotomy in addition to the inherent risks of anesthesia. She was counseled however that if an ectopic pregnancy is identified that, a unilateral salpingectomy that " contains the ectopic pregnancy is considered definitive management for the ectopic pregnancy.      The patient expresses desire for surgical evaluation and management of her ruptured ectopic pregnancy at this time.     -f/u on Rh status  -discussed that she has about a 10% chance of having another ectopic therefore if she becomes pregnant again she should follow up to establish a normal pregnancy as early as possible the next time she becomes pregnant  -all questions answered and pt (and partner) agreeable to plan of care  -additionally she will follow up with me in clinic in 1-2 weeks for a postop visit and to establish routine GYN care  -desires birth control in the future and will discuss it on follow up  -all questions answered and pt agreeable to plan of care       Neha Hartmann DO, FACOG  [facility]

## 2025-05-16 NOTE — OR NURSING
2315 Pt arrived to PACU with Anesthesiologist and OR RN. AAOx4. Even, unlabored respirations. VSS. Pt has x3 lap sites CDI. Hot pack applied. Pt medicated for pain and shivering, no nausea.    2335 Family brought to bedside. Updated on POC, all questions answered.     2313 Attempted to void, but unable to.     2315 Pt received meds from pharmacy for discharge.    0040 Pt education and discharge instructions completed and signed by pt's family member    0045 Pt up to bathroom and able to void. IV removed    0057 Pt discharged to home with family via wheelchair

## 2025-05-16 NOTE — ANESTHESIA PREPROCEDURE EVALUATION
Case: [id number] Date/Time: 05/15/25 2049    Procedures:       PELVISCOPY      DILATION AND CURETTAGE RUPTURED ECTOPIC    Location: [facility] OR 09 / SURGERY [facility]    Surgeons: Neha Hartmann D.O.          23 yo F w/ ruptured ectopic pregnancy    Relevant Problems   No relevant active problems       Physical Exam    Airway   Mallampati: II  TM distance: >3 FB  Neck ROM: full       Cardiovascular - normal exam  Rhythm: regular  Rate: normal    (-) murmur     Dental - normal exam           Pulmonary - normal examBreath sounds clear to auscultation     Abdominal    Neurological - normal exam                   Anesthesia Plan    ASA 2- EMERGENT   ASA physical status emergent criteria: acute hemorrhage    Plan - general       Airway plan will be ETT          Induction: intravenous    Postoperative Plan: Postoperative administration of opioids is intended.    Pertinent diagnostic labs and testing reviewed    Informed Consent:    Anesthetic plan and risks discussed with patient.    Use of blood products discussed with: patient whom consented to blood products.

## 2025-05-16 NOTE — ANESTHESIA PROCEDURE NOTES
Airway    Date/Time: 5/15/2025 10:20 PM    Performed by: Bryan Suarez MD, PhD  Authorized by: Bryan Suarez MD, PhD    Location:  OR  Urgency:  Elective  Indications for Airway Management:  Anesthesia      Spontaneous Ventilation: absent    Sedation Level:  Deep  Preoxygenated: Yes    Patient Position:  Sniffing  Mask Difficulty Assessment:  1 - vent by mask  Final Airway Type:  Endotracheal airway  Final Endotracheal Airway:  ETT  Cuffed: Yes    Technique Used for Successful ETT Placement:  Direct laryngoscopy    Insertion Site:  Oral  Blade Type:  Severo  Laryngoscope Blade/Videolaryngoscope Blade Size:  3  ETT Size (mm):  7.0  Measured from:  Teeth  ETT to Teeth (cm):  22  Placement Verified by: auscultation and capnometry    Cormack-Lehane Classification:  Grade I - full view of glottis  Number of Attempts at Approach:  1

## 2025-05-16 NOTE — ED NOTES
Attempting to start PIV and draw blood. Pt crying and hyperventilating. Pt HR increased to 140s on monitor. Pt unable to sit still in order for attempt at PIV at this time.     ERP at bedside.

## 2025-05-16 NOTE — DISCHARGE INSTRUCTIONS
If any questions arise, call your provider.  If your provider is not available, please feel free to call the Surgical Center at [phone].    MEDICATIONS: Resume taking daily medication.  Take prescribed pain medication with food.  If no medication is prescribed, you may take non-aspirin pain medication if needed.  PAIN MEDICATION CAN BE VERY CONSTIPATING.  Take a stool softener or laxative such as senokot, pericolace, or milk of magnesia if needed.    Last pain medication given at 11:30pm (oxycodone)

## 2025-05-16 NOTE — ED NOTES
After speaking with ERP. Pt agreeable to have emla cream applied and then allowing staff to perform straight stick.

## 2025-05-16 NOTE — OP REPORT
OBSTETRICS AND GYNECOLOGY  Gynecologic Surgical Operative Note     Patient:  Tobias Coffey     Date:  5/15/2025     Surgeon: Neha Hartmann DO, [facility]    Assistant: Dr. Randee Bain    Pre-op diagnosis:   1.  Bleeding in early pregnancy  2.  Hematoperitoneum  3.  Cystic adnexal mass, suspect ruptured ectopic pregnancy.  4.  Pelvic free fluid, suspicious for hemoperitoneum on ultrasound.    Post-op diagnosis:  1. Bleeding in early pregnancy  2.  Hematoperitoneum  3.  Cystic adnexal mass, suspect ruptured ectopic pregnancy.  4.  Pelvic free fluid, suspicious for hemoperitoneum on ultrasound.  5.  Confirmed ruptured ectopic pregnancy.  6.  Confirmed hemoperitoneum.    Procedure: Diagnostic and operative laparoscopy, left salpingectomy    Anesthesia: general via ET tube, local    Findings: Normal appearing vulva, vagina and cervix, mild bleeding though cervix. Moderate amount of hemoperitoneum was noted on entry. After suctioning,  the uterus and bilateral ovaries appeared normal. Large, extruding left, tubal pregnancy noted with blood and clot adhered to distal portion of fallopian tube. Left salpingectomy performed.     EBL: 50ml     UOP: approximately 200ml total from straight cath at the start of the case and at the end of the case     Fluids:  per anesthesia ml of crystaloid     Specimens/Cultures: left fallopian tube containing ectopic pregnancy.      Drains/Foreign Objects Retained: none     Complications: none     Condition: good     Disposition: PACU then home     Narrative:    Having previously been advised on the risks, benefits, and alternatives to diagnostic laparoscopy and surgical treatment of ectopic pregnancy with salpingectomy, and signed an operative consent form in preop confirming her desire to proceed with the procedure. The patient was then taken to the operating room with an IV in place. She was administered general anesthesia by ET tube and then placed into the  dorsal lithotomy position. She was prepped and draped in the usual sterile fashion for a gynecologic laparoscopic procedure. During the prep, the patient had a Peter catheter placed in her bladder and sequential compression devices placed on her legs for thrombotic prevention. At this point, a timeout was taken. The patient and the procedure were positively identified.     Attention was turned to the vaginal portion of the procedure where a bivalve speculum was placed in the vagina. The anterior lip of the cervix was visualized and grasped with a single-tooth tenaculum. An [place] uterine manipulator was then placed through the cervix into the uterus and secured onto the anterior lip of the cervix. The bivalve speculum was removed from the patient's vagina.     The surgical team then donned newly sterile scrub wear and attended to the abdominal portion of the procedure. 0.25% bupivacaine with epinephrine was infiltrated into the infraumbilical area. A vertical skin incision was made sharply with a scalpel in the infraumbilical region. The abdominal wall was then tented up and an optical, bladed trocar was then used under direct visualization to enter the abdomen. CO2 gas was then insufflated into the abdomen with an opening pressure of 4mmHg noted.  The abdomen was then insufflated to a pressure of 15mmHg.      The laparoscope was inserted through the port with the above findings noted.  Atraumatic entry was noted.  Two lateral 5mm ports were placed in left and right lower quadrants, in the same fashion under direct visualization. At this time a thorough investigation of the pelvis was performed with the findings noted above.       First, the patient's cul-de-sac was suctioned of approximately 50mL of blood. The left adnexal mass was grasped and the LigaSure was used to transect along the mesosalpinx to approximately 2cm distal to the cornua.     A 5 mm endocatch bag was placed through the LLQ port and the transected  fallopian tube was placed into the endocatch bag for removal. The LLQ portsite was extended bluntly to facilitate passage of the bag. The specimen bag was then removed from the abdomen. Under direct visualization with the laparoscope. The scope was then returned to the infraumbilical port and the pelvis was evacuated of rest of hemoperitoneum.  After a period of observation with reduction of intraabdominal pressure, surgical site remained hemostatic.       The extended LLQ portsite ws closed using an 0-Vicryl. The abdomen was then desufflated completely of CO2 gas. The LLQ portsite was closed superficially with a 4-0 vicryl and the rest of the 5 mm portsites were closed with Dermabond. The [place] uterine manipulator was then removed followed by the tenaculum  and lastly the Peter catheter, with excellent hemostasis was noted.      The patient tolerated the procedure well. There were no complications. Sponge, lap, needle, and instrument counts were reported to be correct. The patient was awakened and extubated and taken to the recovery room in stable and awake condition for later discharge home.      Neha Hartmann DO, FACOG  [facility]

## 2025-05-16 NOTE — ED NOTES
Patient transported to preop with patient transport. Chart and all belongings sent with transport. Family with patient. Care transferred.

## 2025-05-16 NOTE — ANESTHESIA TIME REPORT
Anesthesia Start and Stop Event Times       Date Time Event    5/15/2025 2209 Ready for Procedure     2213 Anesthesia Start     2320 Anesthesia Stop          Responsible Staff  05/15/25      Name Role Begin End    Bryan Suarez MD, PhD Anesth 2213 2320          Overtime Reason:  no overtime (within assigned shift)    Comments:

## 2025-05-16 NOTE — ED PROVIDER NOTES
"ED Provider Note    CHIEF COMPLAINT  Chief Complaint   Patient presents with    Vaginal Bleeding     Began 3 days ago. Reports approx 6 weeks pregnant. Has not had previous US. .     Abdominal Pain     Reports severe lower left abd/pelvic pain.        EXTERNAL RECORDS REVIEWED  [facility]en [facility] the ER [facility]  [facility] .    HPI/ROS  LIMITATION TO HISTORY   Select: : None  OUTSIDE HISTORIAN(S):  None    Tobias Coffey is a 24 y.o. female who presents to the ER for lower abdominal pain and vaginal bleeding.  G2, P0.  Found out she was pregnant last week.  Has not seen anybody for this pregnancy yet.  It is undesired and she has an appointment coming up with [facility].  Symptoms of gotten slightly worse since they started.  Most of her abdominal pain is left lower abdomen/pelvis.  Slightly nauseous at this time.  No dizziness or lightheadedness.  Denies any medical problems.    PAST MEDICAL HISTORY       SURGICAL HISTORY  patient denies any surgical history    FAMILY HISTORY  History reviewed. No pertinent family history.    SOCIAL HISTORY  Social History     Tobacco Use    Smoking status: Never    Smokeless tobacco: Never   Vaping Use    Vaping status: Never Used   Substance and Sexual Activity    Alcohol use: Not Currently    Drug use: Not Currently    Sexual activity: Not on file       CURRENT MEDICATIONS  Home Medications       Reviewed by Ann Marie Hare R.N. (Registered Nurse) on 05/15/25 at 1740  Med List Status: Partial     Medication Last Dose Status        Patient Kirill Taking any Medications                         Audit from Redirected Encounters    **Home medications have not yet been reviewed for this encounter**         ALLERGIES  Allergies[1]    PHYSICAL EXAM  VITAL SIGNS: /65   Pulse 98   Temp 36.5 °C (97.7 °F) (Temporal)   Resp 17   Ht 1.676 m (5' 6\")   Wt 89.8 kg (197 lb 15.6 oz)   LMP 2025   SpO2 97%   BMI " 31.95 kg/m²    General: Sitting up in stretcher, quite anxious about the IV but redirectable  HEENT: NCAT normal conjunctiva  CV: Tachycardic when anxious but then regular rate and rhythm when she calms down  Pulmonary: CTAB  Abdomen: Soft nondistended, tender to palpation in the left lower quadrant suprapubic region, mild left low back tenderness  Pelvic: Deferred    EKG/LABS  hCG is 481.  CBC shows normal cell counts no anemia.  CMP with mild hyperglycemia 113 otherwise normal electrolytes renal function and hepatobiliary labs.  Urinalysis with bacteriuria and hematuria but negative nitrites and LE.  No white blood cells.      RADIOLOGY/PROCEDURES   I have independently interpreted the diagnostic imaging associated with this visit and am waiting the final reading from the radiologist.   My preliminary interpretation is as follows: Suspicion for left ectopic pregnancy possibly ruptured    Radiologist interpretation:  US-OB 1ST TRIMESTER WITH TRANSVAGINAL (COMBO)   Final Result      1.  No intrauterine pregnancy is identified.      2.  Complex mass in the left adnexa is identified which is suspicious for ectopic pregnancy.      3.  Moderate amount of complex fluid is noted in the cul-de-sac.      4.  These findings were communicated with TERESO SINGH on 05/15/2025.             COURSE & MEDICAL DECISION MAKING    ASSESSMENT, COURSE AND PLAN  Care Narrative: Differential includes ectopic pregnancy, TOA, miscarriage, subchorionic hematoma, anemia, other pregnancy anomaly    On arrival the patient is initially hemodynamically stable with all vital signs in normal ranges.  When I evaluated the patient she was quite anxious about getting an IV started and was tachycardic but then when she calmed down heart rate normalized.  She has tenderness in the left lower abdomen left low back.  Differential above considered.  She was given Emla cream for blood draw and oral Zofran.  Labs, urine, transvaginal ultrasound  obtained.    Labs show elevated hCG consistent with pregnancy however her ultrasound shows no IUP but there is an adnexal mass and complex fluid in the cul-de-sac concerning for ruptured ectopic pregnancy.  She is not anemic.  Rh+ no indication for RhoGAM.  I spoke with the on-call gynecologist Dr. Hartmann who took the patient to the OR for further management.    DISPOSITION AND DISCUSSIONS  I have discussed management of the patient with the following physicians and ORION's: Dr. Hartmann gynecology    Discussion of management with other [facility] or appropriate source(s): N/ANone     Escalation of care considered, and ultimately not performed: N/A        FINAL DIAGNOSIS  1. Surgical abdomen    2. Left ovarian pregnancy without intrauterine pregnancy         Electronically signed by: Christopher Marie M.D., 5/15/2025 6:07 PM           [1] No Known Allergies

## 2025-05-16 NOTE — ED NOTES
Pt ambulatory to yellow 61 with steady gait. Pt changed into gown and connected to monitor. Chart up for ERP.

## 2025-05-19 ENCOUNTER — TELEPHONE (OUTPATIENT)
Dept: OBGYN | Facility: CLINIC | Age: 25
End: 2025-05-19

## 2025-05-19 LAB — COMPONENT CELLULAR 8504CLL: NORMAL

## 2025-05-19 NOTE — TELEPHONE ENCOUNTER
Caller Name: Tobias Coffey  Call Back Number: [phone]    How would the patient prefer to be contacted with a response: Phone call OK to leave a detailed message      Bunny motley sent me a teams  message regarding this pt she needs a letter to excuse her from work after getting surgery with doctor Hratmann on the 15 of may.  I sent a message to providers  MA unsure of what the protocol is since pt hasn't been seen by us in clinic had post op appt on 5-29-25.

## 2025-05-19 NOTE — TELEPHONE ENCOUNTER
L/M for pt to return tc to please schedule post-op visit with  at [facility], next week.T

## 2025-05-28 NOTE — PROGRESS NOTES
Pt presents for Post-OP visit.  She is S/P Diag.lap and Lt.Salp 5/15/25 for ruptured Ectopic pregnancy.   Hx of previous ITOP but retained POC and Methotrexate Tx  Pt doing well, however states ongoing pain , using Tylenol/ Ibuprofen, no further Ocycodone.  Eating and drinking normally.  Discuss BC options.Nexplanon possibly?  Ph#[phone]  Pharm# [email or web address]

## 2025-05-29 ENCOUNTER — GYNECOLOGY VISIT (OUTPATIENT)
Dept: OBGYN | Facility: CLINIC | Age: 25
End: 2025-05-29

## 2025-05-29 DIAGNOSIS — Z87.59 HISTORY OF ECTOPIC PREGNANCY: ICD-10-CM

## 2025-05-29 DIAGNOSIS — Z09 SURGICAL FOLLOW-UP CARE: Primary | ICD-10-CM

## 2025-05-29 DIAGNOSIS — Z90.721 HISTORY OF LEFT SALPINGO-OOPHORECTOMY: ICD-10-CM

## 2025-05-29 DIAGNOSIS — Z90.79 HISTORY OF LEFT SALPINGO-OOPHORECTOMY: ICD-10-CM

## 2025-05-29 DIAGNOSIS — Z13.9 SCREENING DUE: ICD-10-CM

## 2025-05-29 NOTE — PROGRESS NOTES
CC:  Incision check   Chief Complaint   Patient presents with    Gynecologic Exam     Post-Op Visit- S/P        HPI:  Tobias Coffey 24 y.o.  who presents for post-op follow up. She had a laparoscopic left salpingectomy for ruptured ectopic pregnancy on 5/15/25 Her recovery was uncomplicated and she was discharged home from the PACU in stable condition a few hours postop.     She mentions that she feels a sharp stabbing pain only when she bends over to  stuff. 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 She hasn't gone back to work. She says due to current work flow, no one is in the office to discuss this with but she plans to. She otherwise has no major concerns today and feels that her incisions are healing well.  She would like to know if she can go back to routine activity     She has not resumed intercourse. She has never had a pap smear and wants the Nexplanon for birth control     Denies lightheadedness or dizziness on ambulation.  Denies abnormal vaginal discharge.      ROS:   General: denies fever / chills  HEENT: denies sore throat:  CV: denies chest pain:  Repiratory: denies shortness of breath  : denies dysuria:     PFSH:  I personally reviewed the past medical and surgical histories.      Social History     Tobacco Use    Smoking status: Never    Smokeless tobacco: Never   Vaping Use    Vaping status: Never Used   Substance Use Topics    Alcohol use: Not Currently    Drug use: Not Currently         There were no vitals filed for this visit.  Gen: appears well, NAD  Respiratory: normal effort  Abdomen: Soft, non-tender. Left-sided pain with bending over not reproducible on palpation. Laparoscopic incisions healing with glue flaking off on the edges. No erythema.    Pelvic Exam: deferred                   ASSESSMENT AND PLAN:  1. Surgical follow-up care        2. Screening due        3. History of ectopic pregnancy        4. History of left salpingectomy          Tobias Roberta Coffey 24 y.o.  s/p laparoscopic left salpingectomy for ruptured ectopic pregnancy here for post-op follow up. Surgery uncomplicated.  Images, findings and pathology reviewed with patient. Patient is making appropriate recovery. Discussed tt some of her pain may be related to possible ovulation given that she is not experiencing pain with abdominal flexion or palpation.     -She may return to normal activities as tolerated  -Follow-up for a Pap and Nexplanon placement  -all questions answered and pt agreeable to plan of care    Neha Hartmann DO, FACOG  [facility]

## 2025-06-11 ENCOUNTER — APPOINTMENT (OUTPATIENT)
Dept: OBGYN | Facility: CLINIC | Age: 25
End: 2025-06-11

## (undated) DEVICE — GOWN WARMING STANDARD FLEX - (30/CA)

## (undated) DEVICE — TUBING CLEARLINK DUO-VENT - C-FLO (48EA/CA)

## (undated) DEVICE — COVER LIGHT HANDLE ALC PLUS DISP (18EA/BX)

## (undated) DEVICE — SUCTION INSTRUMENT YANKAUER BULBOUS TIP W/O VENT (50EA/CA)

## (undated) DEVICE — SET EXTENSION WITH 2 PORTS (48EA/CA) ***PART #2C8610 IS A SUBSTITUTE*****

## (undated) DEVICE — SODIUM CHL IRRIGATION 0.9% 1000ML (12EA/CA)

## (undated) DEVICE — SLEEVE VASO DVT COMPRESSION CALF MED - (10PR/CA)

## (undated) DEVICE — TROCAR 5X100 BLADED Z-THREAD - KII (6/BX)

## (undated) DEVICE — Device

## (undated) DEVICE — CANISTER SUCTION 3000ML MECHANICAL FILTER AUTO SHUTOFF MEDI-VAC NONSTERILE LF DISP (40EA/CA)

## (undated) DEVICE — SENSOR OXIMETER ADULT SPO2 RD SET (20EA/BX)

## (undated) DEVICE — ELECTRODE DUAL RETURN W/ CORD - (50/PK)

## (undated) DEVICE — TRAY SRGPRP PVP IOD WT PRP - (20/CA)

## (undated) DEVICE — TROCAR Z THREAD12MM OPTICAL - NON BLADED (6/BX)

## (undated) DEVICE — PAD SANITARY 11IN MAXI IND WRAPPED (12EA/PK 24PK/CA)

## (undated) DEVICE — SET LEADWIRE 5 LEAD BEDSIDE DISPOSABLE ECG (1SET OF 5/EA)

## (undated) DEVICE — PACK LAPAROSCOPY - (4EA/CA)

## (undated) DEVICE — LACTATED RINGERS INJ 1000 ML - (14EA/CA 60CA/PF)

## (undated) DEVICE — SET SUCTION/IRRIGATION WITH DISPOSABLE TIP (6/CA )PART #0250-070-520 IS A SUB